# Patient Record
Sex: MALE | Race: WHITE | NOT HISPANIC OR LATINO | Employment: FULL TIME | URBAN - METROPOLITAN AREA
[De-identification: names, ages, dates, MRNs, and addresses within clinical notes are randomized per-mention and may not be internally consistent; named-entity substitution may affect disease eponyms.]

---

## 2017-02-17 ENCOUNTER — OFFICE VISIT - HEALTHEAST (OUTPATIENT)
Dept: FAMILY MEDICINE | Facility: CLINIC | Age: 32
End: 2017-02-17

## 2017-02-17 DIAGNOSIS — J20.9 ACUTE BRONCHITIS WITH BRONCHOSPASM: ICD-10-CM

## 2017-02-17 ASSESSMENT — MIFFLIN-ST. JEOR: SCORE: 1939.39

## 2017-02-24 ENCOUNTER — COMMUNICATION - HEALTHEAST (OUTPATIENT)
Dept: FAMILY MEDICINE | Facility: CLINIC | Age: 32
End: 2017-02-24

## 2017-02-24 DIAGNOSIS — J20.9 ACUTE BRONCHITIS WITH BRONCHOSPASM: ICD-10-CM

## 2017-04-25 ENCOUNTER — AMBULATORY - HEALTHEAST (OUTPATIENT)
Dept: FAMILY MEDICINE | Facility: CLINIC | Age: 32
End: 2017-04-25

## 2017-04-26 ENCOUNTER — OFFICE VISIT - HEALTHEAST (OUTPATIENT)
Dept: FAMILY MEDICINE | Facility: CLINIC | Age: 32
End: 2017-04-26

## 2017-04-26 ENCOUNTER — COMMUNICATION - HEALTHEAST (OUTPATIENT)
Dept: FAMILY MEDICINE | Facility: CLINIC | Age: 32
End: 2017-04-26

## 2017-04-26 ENCOUNTER — AMBULATORY - HEALTHEAST (OUTPATIENT)
Dept: FAMILY MEDICINE | Facility: CLINIC | Age: 32
End: 2017-04-26

## 2017-04-26 DIAGNOSIS — R21 RASH: ICD-10-CM

## 2017-04-26 ASSESSMENT — MIFFLIN-ST. JEOR: SCORE: 1989.28

## 2017-04-30 ENCOUNTER — COMMUNICATION - HEALTHEAST (OUTPATIENT)
Dept: FAMILY MEDICINE | Facility: CLINIC | Age: 32
End: 2017-04-30

## 2017-05-01 ENCOUNTER — AMBULATORY - HEALTHEAST (OUTPATIENT)
Dept: FAMILY MEDICINE | Facility: CLINIC | Age: 32
End: 2017-05-01

## 2017-05-14 ENCOUNTER — AMBULATORY - HEALTHEAST (OUTPATIENT)
Dept: FAMILY MEDICINE | Facility: CLINIC | Age: 32
End: 2017-05-14

## 2017-05-22 ENCOUNTER — AMBULATORY - HEALTHEAST (OUTPATIENT)
Dept: FAMILY MEDICINE | Facility: CLINIC | Age: 32
End: 2017-05-22

## 2017-05-22 DIAGNOSIS — R21 RASH: ICD-10-CM

## 2017-05-30 ENCOUNTER — RECORDS - HEALTHEAST (OUTPATIENT)
Dept: ADMINISTRATIVE | Facility: OTHER | Age: 32
End: 2017-05-30

## 2020-04-28 ENCOUNTER — RECORDS - HEALTHEAST (OUTPATIENT)
Dept: ADMINISTRATIVE | Facility: OTHER | Age: 35
End: 2020-04-28

## 2020-05-08 ENCOUNTER — OFFICE VISIT - HEALTHEAST (OUTPATIENT)
Dept: FAMILY MEDICINE | Facility: CLINIC | Age: 35
End: 2020-05-08

## 2020-05-08 DIAGNOSIS — R10.9 LEFT SIDED ABDOMINAL PAIN: ICD-10-CM

## 2020-05-30 ENCOUNTER — COMMUNICATION - HEALTHEAST (OUTPATIENT)
Dept: FAMILY MEDICINE | Facility: CLINIC | Age: 35
End: 2020-05-30

## 2020-05-30 DIAGNOSIS — R10.9 LEFT SIDED ABDOMINAL PAIN: ICD-10-CM

## 2020-06-26 ENCOUNTER — COMMUNICATION - HEALTHEAST (OUTPATIENT)
Dept: FAMILY MEDICINE | Facility: CLINIC | Age: 35
End: 2020-06-26

## 2020-06-26 DIAGNOSIS — R10.9 LEFT SIDED ABDOMINAL PAIN: ICD-10-CM

## 2020-10-26 ENCOUNTER — OFFICE VISIT - HEALTHEAST (OUTPATIENT)
Dept: FAMILY MEDICINE | Facility: CLINIC | Age: 35
End: 2020-10-26

## 2020-10-26 DIAGNOSIS — J01.90 ACUTE SINUSITIS WITH SYMPTOMS > 10 DAYS: ICD-10-CM

## 2020-11-06 ENCOUNTER — COMMUNICATION - HEALTHEAST (OUTPATIENT)
Dept: FAMILY MEDICINE | Facility: CLINIC | Age: 35
End: 2020-11-06

## 2020-11-06 DIAGNOSIS — R51.9 SINUS HEADACHE: ICD-10-CM

## 2020-11-09 ENCOUNTER — OFFICE VISIT - HEALTHEAST (OUTPATIENT)
Dept: OTOLARYNGOLOGY | Facility: CLINIC | Age: 35
End: 2020-11-09

## 2020-11-09 DIAGNOSIS — H69.92 DYSFUNCTION OF LEFT EUSTACHIAN TUBE: ICD-10-CM

## 2020-12-17 ENCOUNTER — OFFICE VISIT - HEALTHEAST (OUTPATIENT)
Dept: FAMILY MEDICINE | Facility: CLINIC | Age: 35
End: 2020-12-17

## 2020-12-17 DIAGNOSIS — Z11.59 ENCOUNTER FOR HCV SCREENING TEST FOR LOW RISK PATIENT: ICD-10-CM

## 2020-12-17 DIAGNOSIS — R20.2 TINGLING IN EXTREMITIES: ICD-10-CM

## 2020-12-17 DIAGNOSIS — Z13.29 SCREENING FOR ENDOCRINE, NUTRITIONAL, METABOLIC AND IMMUNITY DISORDER: ICD-10-CM

## 2020-12-17 DIAGNOSIS — Z13.21 SCREENING FOR ENDOCRINE, NUTRITIONAL, METABOLIC AND IMMUNITY DISORDER: ICD-10-CM

## 2020-12-17 DIAGNOSIS — M19.019 SHOULDER ARTHRITIS: ICD-10-CM

## 2020-12-17 DIAGNOSIS — Z13.228 SCREENING FOR ENDOCRINE, NUTRITIONAL, METABOLIC AND IMMUNITY DISORDER: ICD-10-CM

## 2020-12-17 DIAGNOSIS — Z13.0 SCREENING FOR ENDOCRINE, NUTRITIONAL, METABOLIC AND IMMUNITY DISORDER: ICD-10-CM

## 2020-12-17 DIAGNOSIS — Z11.4 SCREENING FOR HIV WITHOUT PRESENCE OF RISK FACTORS: ICD-10-CM

## 2020-12-18 ENCOUNTER — AMBULATORY - HEALTHEAST (OUTPATIENT)
Dept: LAB | Facility: CLINIC | Age: 35
End: 2020-12-18

## 2020-12-18 DIAGNOSIS — Z13.29 SCREENING FOR ENDOCRINE, NUTRITIONAL, METABOLIC AND IMMUNITY DISORDER: ICD-10-CM

## 2020-12-18 DIAGNOSIS — R20.2 TINGLING IN EXTREMITIES: ICD-10-CM

## 2020-12-18 DIAGNOSIS — Z11.4 SCREENING FOR HIV WITHOUT PRESENCE OF RISK FACTORS: ICD-10-CM

## 2020-12-18 DIAGNOSIS — Z13.0 SCREENING FOR ENDOCRINE, NUTRITIONAL, METABOLIC AND IMMUNITY DISORDER: ICD-10-CM

## 2020-12-18 DIAGNOSIS — Z11.59 ENCOUNTER FOR HCV SCREENING TEST FOR LOW RISK PATIENT: ICD-10-CM

## 2020-12-18 DIAGNOSIS — Z13.228 SCREENING FOR ENDOCRINE, NUTRITIONAL, METABOLIC AND IMMUNITY DISORDER: ICD-10-CM

## 2020-12-18 DIAGNOSIS — M19.019 SHOULDER ARTHRITIS: ICD-10-CM

## 2020-12-18 DIAGNOSIS — Z13.21 SCREENING FOR ENDOCRINE, NUTRITIONAL, METABOLIC AND IMMUNITY DISORDER: ICD-10-CM

## 2020-12-18 LAB
C REACTIVE PROTEIN LHE: <0.1 MG/DL (ref 0–0.8)
ERYTHROCYTE [SEDIMENTATION RATE] IN BLOOD BY WESTERGREN METHOD: 3 MM/HR (ref 0–15)
HBA1C MFR BLD: 5.6 %
HIV 1+2 AB+HIV1 P24 AG SERPL QL IA: NEGATIVE
MAGNESIUM SERPL-MCNC: 1.9 MG/DL (ref 1.8–2.6)
RHEUMATOID FACT SERPL-ACNC: <15 IU/ML (ref 0–30)
TSH SERPL DL<=0.005 MIU/L-ACNC: 1.49 UIU/ML (ref 0.3–5)
URATE SERPL-MCNC: 5 MG/DL (ref 3–8)
VIT B12 SERPL-MCNC: 333 PG/ML (ref 213–816)

## 2020-12-20 LAB — ZINC SERPL-MCNC: 83.9 UG/DL (ref 60–120)

## 2020-12-21 ENCOUNTER — COMMUNICATION - HEALTHEAST (OUTPATIENT)
Dept: FAMILY MEDICINE | Facility: CLINIC | Age: 35
End: 2020-12-21

## 2020-12-21 LAB
25(OH)D3 SERPL-MCNC: 27.6 NG/ML (ref 30–80)
ANA SER QL: 0.2 U
HCV AB SERPL QL IA: NEGATIVE

## 2020-12-22 LAB — CCP AB SER IA-ACNC: <0.5 U/ML

## 2020-12-28 LAB
B LOCUS: NORMAL
B27TEST METHOD: NORMAL

## 2020-12-29 ENCOUNTER — COMMUNICATION - HEALTHEAST (OUTPATIENT)
Dept: FAMILY MEDICINE | Facility: CLINIC | Age: 35
End: 2020-12-29

## 2020-12-29 DIAGNOSIS — R10.9 LEFT SIDED ABDOMINAL PAIN: ICD-10-CM

## 2021-01-14 ENCOUNTER — COMMUNICATION - HEALTHEAST (OUTPATIENT)
Dept: FAMILY MEDICINE | Facility: CLINIC | Age: 36
End: 2021-01-14

## 2021-01-14 DIAGNOSIS — R25.3 MUSCLE TWITCHING: ICD-10-CM

## 2021-01-14 DIAGNOSIS — R10.9 LEFT SIDED ABDOMINAL PAIN: ICD-10-CM

## 2021-02-13 ENCOUNTER — HEALTH MAINTENANCE LETTER (OUTPATIENT)
Age: 36
End: 2021-02-13

## 2021-04-01 ENCOUNTER — IMMUNIZATION (OUTPATIENT)
Dept: NURSING | Facility: CLINIC | Age: 36
End: 2021-04-01
Payer: COMMERCIAL

## 2021-04-01 PROCEDURE — 91300 PR COVID VAC PFIZER DIL RECON 30 MCG/0.3 ML IM: CPT

## 2021-04-01 PROCEDURE — 0001A PR COVID VAC PFIZER DIL RECON 30 MCG/0.3 ML IM: CPT

## 2021-04-22 ENCOUNTER — IMMUNIZATION (OUTPATIENT)
Dept: NURSING | Facility: CLINIC | Age: 36
End: 2021-04-22
Attending: FAMILY MEDICINE
Payer: COMMERCIAL

## 2021-04-22 PROCEDURE — 0002A PR COVID VAC PFIZER DIL RECON 30 MCG/0.3 ML IM: CPT

## 2021-04-22 PROCEDURE — 91300 PR COVID VAC PFIZER DIL RECON 30 MCG/0.3 ML IM: CPT

## 2021-05-03 ENCOUNTER — COMMUNICATION - HEALTHEAST (OUTPATIENT)
Dept: FAMILY MEDICINE | Facility: CLINIC | Age: 36
End: 2021-05-03

## 2021-05-03 DIAGNOSIS — R25.3 MUSCLE TWITCHING: ICD-10-CM

## 2021-05-26 ENCOUNTER — RECORDS - HEALTHEAST (OUTPATIENT)
Dept: ADMINISTRATIVE | Facility: CLINIC | Age: 36
End: 2021-05-26

## 2021-05-30 VITALS — HEIGHT: 68 IN | WEIGHT: 226 LBS | BODY MASS INDEX: 34.25 KG/M2

## 2021-05-30 VITALS — BODY MASS INDEX: 35.92 KG/M2 | WEIGHT: 237 LBS | HEIGHT: 68 IN

## 2021-06-02 ENCOUNTER — RECORDS - HEALTHEAST (OUTPATIENT)
Dept: ADMINISTRATIVE | Facility: OTHER | Age: 36
End: 2021-06-02

## 2021-06-02 ENCOUNTER — AMBULATORY - HEALTHEAST (OUTPATIENT)
Dept: LAB | Facility: HOSPITAL | Age: 36
End: 2021-06-02

## 2021-06-02 ENCOUNTER — OFFICE VISIT (OUTPATIENT)
Dept: NEUROLOGY | Facility: CLINIC | Age: 36
End: 2021-06-02
Payer: COMMERCIAL

## 2021-06-02 VITALS
HEART RATE: 87 BPM | HEIGHT: 69 IN | WEIGHT: 242 LBS | SYSTOLIC BLOOD PRESSURE: 153 MMHG | BODY MASS INDEX: 35.84 KG/M2 | DIASTOLIC BLOOD PRESSURE: 94 MMHG

## 2021-06-02 DIAGNOSIS — R25.3 FASCICULATION: ICD-10-CM

## 2021-06-02 DIAGNOSIS — M48.00 SPINAL STENOSIS, UNSPECIFIED SPINAL REGION: ICD-10-CM

## 2021-06-02 DIAGNOSIS — R25.3 FREQUENT FASCICULATION OF LIMB MUSCLE: Primary | ICD-10-CM

## 2021-06-02 PROBLEM — E55.9 VITAMIN D DEFICIENCY: Status: ACTIVE | Noted: 2021-06-02

## 2021-06-02 PROBLEM — M54.9 CHRONIC BACK PAIN: Status: ACTIVE | Noted: 2021-06-02

## 2021-06-02 PROBLEM — G89.29 CHRONIC BACK PAIN: Status: ACTIVE | Noted: 2021-06-02

## 2021-06-02 LAB
CK SERPL-CCNC: 182 U/L (ref 30–190)
VIT B12 SERPL-MCNC: 290 PG/ML (ref 213–816)

## 2021-06-02 PROCEDURE — 99205 OFFICE O/P NEW HI 60 MIN: CPT | Performed by: PSYCHIATRY & NEUROLOGY

## 2021-06-02 RX ORDER — MULTIVIT-MIN/IRON/FOLIC ACID/K 18-600-40
CAPSULE ORAL
COMMUNITY

## 2021-06-02 RX ORDER — MULTIVITAMIN WITH IRON
1 TABLET ORAL DAILY
COMMUNITY

## 2021-06-02 SDOH — HEALTH STABILITY: MENTAL HEALTH: HOW MANY STANDARD DRINKS CONTAINING ALCOHOL DO YOU HAVE ON A TYPICAL DAY?: NOT ASKED

## 2021-06-02 SDOH — HEALTH STABILITY: MENTAL HEALTH: HOW OFTEN DO YOU HAVE 6 OR MORE DRINKS ON ONE OCCASION?: NOT ASKED

## 2021-06-02 SDOH — HEALTH STABILITY: MENTAL HEALTH: HOW OFTEN DO YOU HAVE A DRINK CONTAINING ALCOHOL?: NOT ASKED

## 2021-06-02 ASSESSMENT — MIFFLIN-ST. JEOR: SCORE: 2023.08

## 2021-06-02 NOTE — NURSING NOTE
Chief Complaint   Patient presents with     Muscle twitching     Pt states he has noticed muscle twitching in bilat calves.     Alka Beasley LPN on 6/2/2021 at 11:58 AM
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
## 2021-06-03 LAB — B BURGDOR IGG+IGM SER QL: 0.12 INDEX VALUE

## 2021-06-04 LAB — HTLV I+II AB SER QL IA: NEGATIVE

## 2021-06-05 VITALS
RESPIRATION RATE: 12 BRPM | DIASTOLIC BLOOD PRESSURE: 84 MMHG | SYSTOLIC BLOOD PRESSURE: 129 MMHG | HEART RATE: 90 BPM | BODY MASS INDEX: 36.93 KG/M2 | WEIGHT: 245 LBS

## 2021-06-07 LAB
ASIALO-GM1 ANTIBODIES, IGG/IGM (HISTORICAL CONVERSION): NORMAL (ref 0–50)
GD1A ANTIBODIES, IGG/IGM (HISTORICAL CONVERSION): 6 IV (ref 0–50)
GD1B ANTIBODIES, IGG/IGM (HISTORICAL CONVERSION): 7 IV (ref 0–50)
GM1 GANGL IGG+IGM SER-ACNC: 11 IV (ref 0–50)
GM2 ANTIBODIES, IGG/IGM (HISTORICAL CONVERSION): 7 IV (ref 0–50)
GQ1B ANTIBODIES, IGG/IGM (HISTORICAL CONVERSION): 6 IV (ref 0–50)

## 2021-06-08 LAB
COPPER SERPL-MCNC: 118.4 UG/DL (ref 70–140)
METHYLMALONATE SERPL-SCNC: 0.12 UMOL/L (ref 0–0.4)

## 2021-06-08 NOTE — PROGRESS NOTES
"ASSESSMENT & PLAN:  1. Acute bronchitis with bronchospasm  - azithromycin (ZITHROMAX Z-SELINA) 250 MG tablet; 2 tabs (500 mg ) day #1, then 1 tab (250 mg) days #2-5, total 5 days  Dispense: 6 tablet; Refill: 0  - predniSONE (DELTASONE) 20 MG tablet; Take 2 tablets (40 mg total) by mouth daily for 5 days.  Dispense: 10 tablet; Refill: 0    Continue good hydration, extra vitamin C, possible side effect of the medication were discussed,return if not improving or his symptoms get worse.    There are no Patient Instructions on file for this visit.    No orders of the defined types were placed in this encounter.    There are no discontinued medications.    No Follow-up on file.    CHIEF COMPLAINT:  Chief Complaint   Patient presents with     Cough     X 12/01 - SEEN [facility] 01/08 - WORSE X 01/08       HISTORY OF PRESENT ILLNESS:  Krish is a 31 y.o. male presenting to the clinic today complaining of productive cough. He initially had dry cough around 12/01/17, which resolved after a few days. On 01/03/17, the cough returned with associated symptoms of chest tightness, headaches and fatigue. He visited the [facility] on 01/08/17 and was prescribed an albuterol inhaler and Tessalon. He is using the inhaler, but continues to have cough. He is using cough drops and tried Dayquil with minimal improvement. He denies fever, congestion, sore throat, and headache at this time. He has a constant \"tickle\" in his throat.    REVIEW OF SYSTEMS:   All other systems are negative.    PFSH:  Tobacco Use:  History   Smoking Status     Former Smoker   Smokeless Tobacco     Not on file       VITALS:  Vitals:    02/17/17 0925   BP: 122/84   Patient Site: Right Arm   Pulse: 68   Resp: 13   Temp: 98.7  F (37.1  C)   SpO2: 98%   Weight: (!) 226 lb (102.5 kg)   Height: 5' 8.3\" (1.735 m)     Wt Readings from Last 3 Encounters:   02/17/17 (!) 226 lb (102.5 kg)   08/30/16 (!) 231 lb (104.8 kg)     Body mass index is 34.06 kg/(m^2).    PHYSICAL " EXAM:  General:  Patient alert, in no acute distress.  Throat:  Oropharynx normal.   Neck:  Supple, without thyromegaly or mass, no adenopathies.  Lymphatic: Normal palpation of neck.  No lymphadenopathy.  No bruising.  Resp:  Minimal expiratory wheezing, sparse rales, no rhonchi in mid upper lung area.  Normal respiratory effort.   CV:  Regular rate and rhythm without murmurs, rubs or gallops.  Neuro:  CN II-XII intact.  Psychiatric:  Alert & oriented x 3.    ADDITIONAL HISTORY SUMMARIZED (2): None.  DECISION TO OBTAIN EXTRA INFORMATION (1): Care everywhere.   RADIOLOGY TESTS (1): None.  LABS (1): None.  MEDICINE TESTS (1): None.  INDEPENDENT REVIEW (2 each): None.     The visit lasted a total of 10 minutes face to face with the patient. Over 50% of the time was spent counseling and educating the patient about cough, antibiotics, steroids.    Jackie ARENAS, am scribing for and in the presence of, Dr. Cesar.    I, Dr. Cesar, personally performed the services described in this documentation, as scribed by Jackie Angel in my presence, and it is both accurate and complete.    Dragon dictation was used for this note.  Speech recognition errors are a possibility.    MEDICATIONS:  Current Outpatient Prescriptions   Medication Sig Dispense Refill     azithromycin (ZITHROMAX Z-SELINA) 250 MG tablet 2 tabs (500 mg ) day #1, then 1 tab (250 mg) days #2-5, total 5 days 6 tablet 0     predniSONE (DELTASONE) 20 MG tablet Take 2 tablets (40 mg total) by mouth daily for 5 days. 10 tablet 0     No current facility-administered medications for this visit.        Total data points: 1

## 2021-06-08 NOTE — PROGRESS NOTES
"Krish Mcallister is a 34 y.o. male who is being evaluated via a billable telephone visit.      The patient has been notified of following:     \"This telephone visit will be conducted via a call between you and your physician/provider. We have found that certain health care needs can be provided without the need for a physical exam.  This service lets us provide the care you need with a short phone conversation.  If a prescription is necessary we can send it directly to your pharmacy.  If lab work is needed we can place an order for that and you can then stop by our lab to have the test done at a later time.    Telephone visits are billed at different rates depending on your insurance coverage. During this emergency period, for some insurers they may be billed the same as an in-person visit.  Please reach out to your insurance provider with any questions.    If during the course of the call the physician/provider feels a telephone visit is not appropriate, you will not be charged for this service.\"    Patient has given verbal consent to a Telephone visit? Yes    What phone number would you like to be contacted at? [phone]    Patient would like to receive their AVS by AVS Preference: Mail a copy.    Additional provider notes:   Went to [facility] ER on 4/28/20 for left sided chest pain and mild dizziness.  Work-up normal (EKG, CXR, CBC, BMP, d dimer, troponin)  Pain still present, he describes it as more lower left chest pain, at lower anterior ribs into abdomen.  No new symptoms.  No constipation, diarrhea, fevers, or vomiting.  Appetite is better now than last week.  No significant change in eating habits prior to symptoms starting.  Never had pain like this before.  No dysuria, no blood in urine.  Pain does not radiate, lasts for a few sec, happens daily, 15-20 times a day, at rest or active, at random times.  No recent falls or injuries known.  Generally healthy, no current medications or health conditions, no " past surgeries.  No known sick contacts, no recent foreign travel or camping.  No significant use of NSAIDs.  Overall pain for 1.5 weeks now.  BMI 35.4 (calculated from vitals at ER visit)  BP normal at ER.    I reviewed ER note and all associated labs, diagnostic study reports today.    General: A&O x 3  Respiratory: no shortness of breath, wheezing, or cough heard  Neuro: answerers questions appropriately, normal mentation    Assessment/Plan:  1. Left sided abdominal pain  2. ER Follow-up  Differential includes GERD, H. Pylori, dyspepsia, musculoskeletal pain, nonspecific abdominal pain, kidney stone, versus other.  Gall bladder less likely given symptom report.  He does not have an acute abdomen, based on our conversation today.  Work-up for chest pain at ER grossly normal, no sign of PE or cardiac issues.  Discussed treatment options, including observation, trial of GERD medicine, GI referral, H. Pylori testing, CT scan.  Pain is not interfering with normal activities, but still present.  He would like to try GERD medicine for 1-2 weeks and monitor.  We discussed reasons to notify us sooner, or go to ER if needed.  If no significant improvement in 2 weeks, contact us and we can discuss next steps in work up.  - omeprazole (PRILOSEC) 20 MG capsule; Take 1 pill by mouth 1-2 times a day.  Dispense: 30 capsule; Refill: 0      Phone call duration:  15 minutes  14:07 14:22    ADI Forbes PA-C

## 2021-06-08 NOTE — TELEPHONE ENCOUNTER
Called and spoke with Krish Mcallister , Message was given, Krish Mcallister  understood, no further questions.

## 2021-06-12 NOTE — TELEPHONE ENCOUNTER
Question following Office Visit  When did you see your provider: 10/26/20  What is your question: Patient is done with amoxicllin given to him by Dr. Cesar for sinus infection.  He did not get any relief from antibiotic.  What is next step?  Okay to leave a detailed message: Yes

## 2021-06-12 NOTE — PROGRESS NOTES
HISTORY OF PRESENT ILLNESS  Asked to see by Dr. Cesar for evaluation of headache. Patient reports that about 2 months ago he had a sore throat that turned into a throbbing behind the ears. He was diagnosed with possible ET dysfunction and prescribed Flonase. He was also prescribed Amoxicillin that didn't do anything. The left ear feels full and throbs every once in awhile. No problems hearing. No vertigo. No drainage out of the ear. No prior ear surgery. No ringing or noises in the ear.     REVIEW OF SYSTEMS  Review of Systems: a 10-system review was performed. Pertinent positives are noted in the HPI and on a separate scanned document in the chart.    PMH, PSH, FH and SH has documented in the EHR.      EXAM    CONSTITUTIONAL  General Appearance:  Normal, well developed, well nourished, no obvious distress  Ability to Communicate:  communicates appropriately.    HEAD AND FACE  Appearance and Symmetry:  Normal, no scalp or facial scarring or suspicious lesions.  Paranasal sinuses tenderness:  Normal, Paranasal sinuses non tender    EYE  Normal external eye, conjunctiva, lids, cornea.     EARS  Clinical speech reception threshold:  Normal, able to hear normal speech.  Auricle:  Normal, Auricles without scars, lesions, masses.  External auditory canal:  Normal, External auditory canal normal.  Tympanic membrane:  Normal, Tympanic membranes normal without swelling or erythema.  Tympanic membrane mobility:  Normal, Normal tympanic membrane mobility.    NOSE (speculum or scope)  Architecture:  Normal, Grossly normal external nasal architecture with no masses or lesions.  Mucosa:  Normal mucosa, No polyps or masses.  Septum:  Normal, Septum non-obstructing.  Turbinates:  Normal, No turbinate abnormalities    NASOPHARYNX  Clear without evidence of inflammation or infection.    ORAL CAVITY AND OROPHARYNX  Lips:  Normal.  Dental and gingiva:  Normal, No obvious dental or gingival disease.  Mucosa:  Normal, Moist mucous  membranes.  Tongue:  Normal, Tongue mobile with no mucosal abnormalities  Hard and soft palate:  Normal, Hard and soft palate without cleft or mucosal lesions.  Oral pharynx:  Normal, Posterior pharynx without lesions or remarkable asymmetry.  Saliva:  Normal, Clear saliva.  Masses:  Normal, No palpable masses or pathologically enlarged lymph nodes.    NECK  Masses/lymph nodes:  Normal, No worrisome neck masses or lymph nodes.  Salivary glands:  Normal, Parotid and submandibular glands.  Trachea and larynx position:  Normal, Trachea and larynx midline.  Thyroid:  Normal, No thyroid abnormality.  Tenderness:  Normal, No cervical tenderness.  Suppleness:  Normal, Neck supple    CARDIOVASCULAR  Regular rate and rhythm.  No cyanosis, clubbing or edema.    PULSES  Carotid pulses normal    NEUROLOGICAL  Speech pattern:  Normal, Proasaic    RESPIRATORY  Symmetry and Respiratory effort:  Normal, Symmetric chest movement and expansion with no increased intercostal retractions or use of accessory muscles.     IMPRESSION  Agree with the initial impression of Eustachian tube dysfunction. However I explained that we would need to obtain a hearing test for more certainty. I explained that ET dysfunction can take 3 or months to resolve. Steroid sprays can be hit or miss.    RECOMMENDATION  I explained that we can certainly schedule a hearing test. He would prefer to observe for another month or two and if it doesn't resolve, he will call to schedule a hearing test.    Lev Pearce MD

## 2021-06-12 NOTE — PROGRESS NOTES
OFFICE VISIT - FAMILY MEDICINE     ASSESSMENT AND PLAN     1. Acute sinusitis with symptoms > 10 days  amoxicillin (AMOXIL) 875 MG tablet   Headaches, differential diagnosis discussed included, migraine, sinus headache etc., will treat his acute sphenoidal sinusitis with amoxicillin, continue Tylenol or Excedrin as needed, follow-up if no improvement.    CHIEF COMPLAINT   Headache (ongoing headaches for a couple of weeks, the headache is located above his left eyebrow) and Follow up (ringing in left ear)    HPI   Krish RUBIO Sisterman is a 35 y.o. male.  Updated MIIC     Did have cold symptoms with sore throat, nasal congestion about a month and a half ago, he was treated symptomatically, he did have with virtual visit, Flonase was suggested, he did use that with some mild relief of symptoms, for the past few days been noticing more  headaches in the middle of the head area, above the eyebrow, mild throbbing, no phonophobia, photophobia, mild ear and sinus pressure.  Has not tried any other specific treatment.    Review of Systems As per HPI, otherwise negative.    OBJECTIVE   /84 (Patient Site: Right Arm, Patient Position: Sitting, Cuff Size: Adult Large)   Pulse 90   Resp 12   Wt (!) 245 lb (111.1 kg)   BMI 36.93 kg/m    Physical Exam   Constitutional: He is oriented to person, place, and time. He appears well-developed and well-nourished.   HENT:   Head: Normocephalic and atraumatic.   Neck: Normal range of motion. Neck supple. No JVD present. No tracheal deviation present. No thyromegaly present.   Cardiovascular: Normal rate, regular rhythm, normal heart sounds and intact distal pulses. Exam reveals no gallop and no friction rub.   No murmur heard.  Pulmonary/Chest: Effort normal and breath sounds normal. No respiratory distress. He has no wheezes. He has no rales.   Musculoskeletal:         General: No tenderness or edema.   Lymphadenopathy:     He has no cervical adenopathy.   Neurological: He is alert  and oriented to person, place, and time. Coordination normal.   Psychiatric: He has a normal mood and affect. Judgment and thought content normal.       PFSH     Family History   Problem Relation Age of Onset     No Medical Problems Mother      No Medical Problems Sister      No Medical Problems Brother      No Medical Problems Son      No Medical Problems Maternal Aunt      No Medical Problems Maternal Uncle      No Medical Problems Paternal Aunt      No Medical Problems Paternal Uncle      Alcohol abuse Maternal Grandmother      Breast cancer Maternal Grandmother      Cancer Maternal Grandfather      Stroke Paternal Grandmother      Hypertension Paternal Grandfather      Social History     Socioeconomic History     Marital status:      Spouse name: Not on file     Number of children: Not on file     Years of education: Not on file     Highest education level: Not on file   Occupational History     Not on file   Social Needs     Financial resource strain: Not on file     Food insecurity     Worry: Not on file     Inability: Not on file     Transportation needs     Medical: Not on file     Non-medical: Not on file   Tobacco Use     Smoking status: Current Every Day Smoker     Smokeless tobacco: Never Used   Substance and Sexual Activity     Alcohol use: Not on file     Drug use: Not on file     Sexual activity: Not on file   Lifestyle     Physical activity     Days per week: Not on file     Minutes per session: Not on file     Stress: Not on file   Relationships     Social connections     Talks on phone: Not on file     Gets together: Not on file     Attends Evangelical service: Not on file     Active member of club or organization: Not on file     Attends meetings of clubs or organizations: Not on file     Relationship status: Not on file     Intimate partner violence     Fear of current or ex partner: Not on file     Emotionally abused: Not on file     Physically abused: Not on file     Forced sexual  activity: Not on file   Other Topics Concern     Not on file   Social History Narrative     Not on file     Relevant history was reviewed with the patient today, unless noted in HPI, nothing is pertinent for this visit.  [facility]     Patient Active Problem List    Diagnosis Date Noted     Obesity (BMI 30.0-34.9) 08/30/2016     No past surgical history on file.    RESULTS/CONSULTS (Lab/Rad)   No results found for this or any previous visit (from the past 168 hour(s)).  No results found.  MEDICATIONS     Current Outpatient Medications on File Prior to Visit   Medication Sig Dispense Refill     nystatin (MYCOSTATIN) cream APPLY TOPICALLY TO AFFECTED AREA TWICE A DAY. MIX IN EQUAL PARTS WITH TRIAMCINOLONE AND APPLY  0     omeprazole (PRILOSEC) 20 MG capsule Take 1 pill by mouth once a day 30 capsule 0     triamcinolone (KENALOG) 0.1 % cream APPLY SMALL AMOUNT TO AFFECTED AREA TWICE DAILY X14D. MIX AS DIRECTED IN EQUAL PARTS WITH NYSTATIN.  0     No current facility-administered medications on file prior to visit.        HEALTH MAINTENANCE / SCREENING   PHQ-2 Total Score: 0 (10/26/2020 11:30 AM)  Depression Follow-up Plan: mental health screening assessment (10/26/2020 11:30 AM)  , No data recorded,No data recorded  Immunization History   Administered Date(s) Administered     DTaP, historic 10/19/2000     INFLUENZA,SEASONAL QUAD, PF, =/> 6months 10/10/2018, 11/01/2019     Influenza, inj, historic,unspecified 10/10/2012, 09/25/2013, 10/12/2016, 10/05/2020     MMR 05/01/1997     Td,adult,historic,unspecified 05/01/1997     Tdap 10/10/2012, 08/30/2016     Health Maintenance   Topic     HEPATITIS C SCREENING      Pneumococcal Vaccine: Pediatrics (0 to 5 Years) and At-Risk Patients (6 to 64 Years) (1 of 1 - PPSV23)     HIV SCREENING      PREVENTIVE CARE VISIT      LIPID      ADVANCE CARE PLANNING      TDAP ADULT ONE TIME DOSE      INFLUENZA VACCINE RULE BASED      HEPATITIS B VACCINES      TD 18+ LISSETTE HERNÁNDEZ  MD Arsenio  Family Medicine, [facility]     This note was dictated using a voice recognition software.  Any grammatical or context distortion are unintentional and inherent to the software.

## 2021-06-13 NOTE — TELEPHONE ENCOUNTER
Reason for Call:  Request for results:    Name of test or procedure: bloodwork  multiple  tests    Date of test of procedure: 12/18/2020    Location of the test or procedure: [facility]      OK to leave the result message on voice mail or with a family member? Yes     Phone number Patient can be reached at:   Cell number on file:    Telephone Information:   Mobile [phone]       Additional comments:none  Call taken on 12/21/2020 at 2:40 PM by Kayla Palacios

## 2021-06-13 NOTE — PROGRESS NOTES
"Krish Mcallister is a 35 y.o. male who is being evaluated via a billable telephone visit.      The patient has been notified of following:     \"This telephone visit will be conducted via a call between you and your physician/provider. We have found that certain health care needs can be provided without the need for a physical exam.  This service lets us provide the care you need with a short phone conversation.  If a prescription is necessary we can send it directly to your pharmacy.  If lab work is needed we can place an order for that and you can then stop by our lab to have the test done at a later time.    Telephone visits are billed at different rates depending on your insurance coverage. During this emergency period, for some insurers they may be billed the same as an in-person visit.  Please reach out to your insurance provider with any questions.    If during the course of the call the physician/provider feels a telephone visit is not appropriate, you will not be charged for this service.\"    Patient has given verbal consent to a Telephone visit? Yes    What phone number would you like to be contacted at? [phone]  Patient would like to receive their AVS by AVS Preference: Gayle.    Additional provider notes: Has been experiencing soreness of both shoulder, tingling sensation at both calf and tongue, symptoms are getting to a point that they seems to be interfering with his quality of life, has been there for the past few weeks.  No swelling lower extremities, no difficulty moving his upper extremity, but persistent soreness.  Denies any fever chills.  No nausea vomiting.  He did have some issue with recurrent sinusitis, ringing in the ear was seen by ENT, symptoms are improving.    Assessment/Plan:  1. Shoulder arthritis  - Uric Acid; Future  - Antinuclear Antibodies Screen (JOSEPHINE); Future  - CCP Antibodies; Future  - Rheumatoid Factor Quant; Future  - C-Reactive Protein; Future  - HLA-B27 Typing; " Future  - Erythrocyte Sedimentation Rate; Future    2. Tingling in extremities  - Vitamin B12; Future  - Zinc, Serum or Plasma; Future  - Magnesium; Future    3. Screening for HIV without presence of risk factors  - HIV Antigen/Antibody Screening Cascade; Future    4. Encounter for HCV screening test for low risk patient  - Hepatitis C Antibody (Anti-HCV); Future    5. Screening for endocrine, nutritional, metabolic and immunity disorder  - Thyroid Cascade; Future  - Glycosylated Hemoglobin A1c; Future  - Vitamin D, Total (25-Hydroxy); Future    Shoulder arthritis, tingling in extremity and tongue, we did review a broad differential included dehydration, electrolytes imbalance, vitamin deficiency etc., encouraged patient to stay hydrated, currently taking multivitamin daily, will schedule some arthritis and additional lab works, he will be notified of the results, will let us know if his symptoms get worse in the meantime.  Phone call duration:  12 minutes

## 2021-06-14 ENCOUNTER — HOSPITAL ENCOUNTER (OUTPATIENT)
Dept: MRI IMAGING | Facility: HOSPITAL | Age: 36
Discharge: HOME | End: 2021-06-14
Attending: PSYCHIATRY & NEUROLOGY
Payer: COMMERCIAL

## 2021-06-14 DIAGNOSIS — R25.3 FREQUENT FASCICULATION OF LIMB MUSCLE: ICD-10-CM

## 2021-06-14 DIAGNOSIS — M48.00 SPINAL STENOSIS, UNSPECIFIED SPINAL REGION: ICD-10-CM

## 2021-06-14 NOTE — TELEPHONE ENCOUNTER
Caller:  Patient.   Message:  Patient stated he started taking OTC vitamin D and Magnesium as you had requested him. He is starting to have muscle twitching on his leg since Friday. He started these medications 12/26/2020. Patient wants to know if there is anything he needs to do?     Okay to leave a detail message when calling patient back.

## 2021-06-15 ENCOUNTER — MYC MEDICAL ADVICE (OUTPATIENT)
Dept: NEUROLOGY | Facility: CLINIC | Age: 36
End: 2021-06-15

## 2021-06-15 ENCOUNTER — TELEPHONE (OUTPATIENT)
Dept: NEUROLOGY | Facility: CLINIC | Age: 36
End: 2021-06-15

## 2021-06-15 NOTE — TELEPHONE ENCOUNTER
Pt called. [facility] Does he still need the EMG ?  [phone]

## 2021-06-15 NOTE — TELEPHONE ENCOUNTER
Called Krish and left him a message- yes he needs to have the EMG. I will also send him a 51fanli message as well.   Kailee Alfredo CMA on 6/15/2021 at 2:36 PM

## 2021-06-17 ENCOUNTER — OFFICE VISIT (OUTPATIENT)
Dept: NEUROLOGY | Facility: CLINIC | Age: 36
End: 2021-06-17
Payer: COMMERCIAL

## 2021-06-17 ENCOUNTER — RECORDS - HEALTHEAST (OUTPATIENT)
Dept: ADMINISTRATIVE | Facility: OTHER | Age: 36
End: 2021-06-17

## 2021-06-17 ENCOUNTER — OFFICE VISIT (OUTPATIENT)
Dept: NEUROLOGY | Facility: CLINIC | Age: 36
End: 2021-06-17
Attending: PSYCHIATRY & NEUROLOGY
Payer: COMMERCIAL

## 2021-06-17 VITALS
WEIGHT: 240 LBS | SYSTOLIC BLOOD PRESSURE: 192 MMHG | HEART RATE: 91 BPM | BODY MASS INDEX: 36.37 KG/M2 | HEIGHT: 68 IN | DIASTOLIC BLOOD PRESSURE: 110 MMHG

## 2021-06-17 DIAGNOSIS — G56.01 CARPAL TUNNEL SYNDROME OF RIGHT WRIST: ICD-10-CM

## 2021-06-17 DIAGNOSIS — S12.100A DENS FRACTURE, CLOSED, INITIAL ENCOUNTER (H): Primary | ICD-10-CM

## 2021-06-17 DIAGNOSIS — R25.3 FREQUENT FASCICULATION OF LIMB MUSCLE: ICD-10-CM

## 2021-06-17 DIAGNOSIS — R25.3 FREQUENT FASCICULATION OF LIMB MUSCLE: Primary | ICD-10-CM

## 2021-06-17 DIAGNOSIS — R25.3 BENIGN FASCICULATION-CRAMP SYNDROME: ICD-10-CM

## 2021-06-17 DIAGNOSIS — M48.00 SPINAL STENOSIS, UNSPECIFIED SPINAL REGION: ICD-10-CM

## 2021-06-17 DIAGNOSIS — M47.816 SPONDYLOSIS OF LUMBAR REGION WITHOUT MYELOPATHY OR RADICULOPATHY: ICD-10-CM

## 2021-06-17 DIAGNOSIS — E66.01 MORBID OBESITY (H): ICD-10-CM

## 2021-06-17 PROCEDURE — 95886 MUSC TEST DONE W/N TEST COMP: CPT | Mod: RT | Performed by: PSYCHIATRY & NEUROLOGY

## 2021-06-17 PROCEDURE — 95913 NRV CNDJ TEST 13/> STUDIES: CPT | Performed by: PSYCHIATRY & NEUROLOGY

## 2021-06-17 PROCEDURE — 99214 OFFICE O/P EST MOD 30 MIN: CPT | Mod: 25 | Performed by: PSYCHIATRY & NEUROLOGY

## 2021-06-17 ASSESSMENT — MIFFLIN-ST. JEOR: SCORE: 1993.13
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
## 2021-06-17 NOTE — LETTER
6/17/2021         RE: Krish Mcallister  [address] [facility] [place] [address]        Dear Colleague,    Thank you for referring your patient, Krish Mcallister, to the [facility] [place]. Please see a copy of my visit note below.    See procedure note for EMG report      Again, thank you for allowing me to participate in the care of your patient.        Sincerely,        Husam Guerra MD

## 2021-06-17 NOTE — NURSING NOTE
Component      Latest Ref Rng & Units 6/2/2021   Asialo-GM1 Antibodies, IgG/IgM      0 - 50 See Note   GM1 Antibodies, IgG/IgM      0 - 50 IV 11   GM2 Antibodies, IgG/IgM      0 - 50 IV 7   GD1a Antibodies, IgG/IgM      0 - 50 IV 6   GD1b Antibodies, IgG/IgM      0 - 50 IV 7   GQ1b Antibodies, IgG/IgM      0 - 50 IV 6   HTLV-I/-II AB SCREEN, S      Negative Negative   Copper, Serum/Plasma      70.0 - 140.0 ug/dL 118.4   MMA Serum/Plasma, Vitamin B12 Status      0.00 - 0.40 umol/L 0.12   Lyme Antibody Cascade      <0.90 Index Value 0.12   Vitamin B-12      213 - 816 pg/mL 290   CK, Total      30 - 190 U/L 182

## 2021-06-17 NOTE — NURSING NOTE
EXAM: MR LUMBAR SPINE WO CONTRAST  LOCATION: [facility]  DATE/TIME: 6/14/2021 9:29 AM     INDICATION: Fasciculation, back pain  COMPARISON: None.  TECHNIQUE: Routine Lumbar Spine MRI without IV contrast.     FINDINGS:   Nomenclature is based on 5 lumbar type vertebral bodies. Normal vertebral body heights and alignment. No concerning bone marrow lesions. Normal distal spinal cord and cauda equina with conus medullaris at L1-L2. Unremarkable visualized bony pelvis.     Several levels demonstrate mild disc desiccation. Disc space heights are maintained. Several levels demonstrate small Schmorl's nodes. T11 vertebral body inferior endplate prominent Schmorl's nodes with adjacent mild degenerative type I Modic change.   Multiple levels demonstrate small endplate osteophytes. Facet compatible with mild multilevel degenerative disc disease.     T11-T12: Mild bulge. No significant thecal sac stenosis. No significant neural foraminal stenosis.     T12-L1: No significant bulge or posterior disc herniation.   No significant thecal sac stenosis.  No significant left foraminal stenosis.  No significant right foraminal stenosis.     L1-L2: No significant bulge or posterior disc herniation.   No significant thecal sac stenosis.  No significant left foraminal stenosis.  No significant right foraminal stenosis.     L2-L3: No significant bulge or posterior disc herniation.   No significant thecal sac stenosis.  No significant left foraminal stenosis.  No significant right foraminal stenosis.      L3-L4: No significant bulge or posterior disc herniation.   No significant thecal sac stenosis.  No significant left foraminal stenosis.  No significant right foraminal stenosis.     L4-L5: Slight bulge.   No significant thecal sac stenosis.  Minimal left foraminal stenosis.  Minimal right foraminal stenosis.     L5-S1: Slight bulge.   No significant thecal sac stenosis. Left foraminal small disc osteophyte complex  coming in proximity and possibly contacting the exiting left L5 nerve root. Minimal left foraminal stenosis.  No significant right foraminal stenosis.           ADDITIONAL FINDINGS:  Colonic diverticulosis.     IMPRESSION:   1.  Mild degenerative changes described above.  2.  L5-S1 left foraminal small disc osteophyte complex coming in proximity and possibly contacting the exiting left L5 nerve root. Minimal left foraminal stenosis.  3.  No severe high-grade neural foraminal stenosis.  4.  No significant thecal sac stenosis

## 2021-06-17 NOTE — LETTER
6/17/2021         RE: Krish Mcallister  [address] [facility] [place] [address]        Dear Colleague,    Thank you for referring your patient, Krish Mcallister, to the [facility]UROLOG[facility] [place]. Please see a copy of my visit note below.    See procedure note for EMG report      Again, thank you for allowing me to participate in the care of your patient.        Sincerely,        Husam Guerra MD

## 2021-06-17 NOTE — PROCEDURES
ELECTROMYOGRAPHY (EMG) REPORT       [facility] NEUROLOGY [place] [address] [place] [address]  Tel: [phone]  Fax: [phone]  [email or web address]     Krish Mcallister,  1985, MRN [id number]  PCP: Waldemar Cesar  Date: 2021     Principal Diagnosis:    Frequent fasciculation of limb muscle  Spinal stenosis, unspecified spinal region     Height: 5 feet 8 inch  Reason for referral: Evaluate right upper/bilateral lowers. c/o pain, muscle twitching in legs > a year.       Motor NCS      Nerve / Sites Lat Amp Dist Abel    ms mV cm m/s   R Median - APB      Wrist 4.64 3.7 7       Elbow 9.11 3.3 25 56   R Ulnar - ADM      Wrist 2.71 12.2 7       B.Elbow 6.15 11.9 20.5 60      A.Elbow 7.76 11.9 10 62   R Peroneal - EDB      Ankle 4.22 7.0 8       Fib head 10.68 6.7 29.5 46      Pop fossa 12.86 5.9 10 46   L Peroneal - EDB      Ankle 3.23 10.7 8       Fib head 9.74 10.6 30 46      Pop fossa 11.93 10.3 10 46   R Tibial - AH      Ankle 4.95 11.3 8       Pop fossa 13.23 10.7 39 47   L Tibial - AH      Ankle 4.53 11.2 8       Pop fossa 12.71 10.6 38 46       F  Wave      Nerve Fmin    ms   R Ulnar - ADM 28.33   R Tibial - AH 54.01   L Tibial - AH 50.16       Sensory NCS      Nerve / Sites Onset Lat Pk Lat Amp.2-3 Dist Abel Lat Diff    ms ms  V cm m/s ms   R Median - II (Antidr)      Wrist 3.02 3.80 54.0 13 43    R Ulnar - V (Antidr)      Wrist 1.98 2.55 43.2 11 56    R Median, Ulnar - Transcarpal comparison      Median Palm 2.08 2.71 29.5 8 38       Ulnar Palm 1.41 1.93 25.5 8 57          0.78   R Sural - Ankle (Calf)      Calf 2.76 3.54 35.1 14 51    L Sural - Ankle (Calf)      Calf 2.55 3.44 26.1 14 55    R Superficial peroneal - Ankle      Lat leg 2.34 2.92 8.9 12 51    L Superficial peroneal - Ankle      Lat leg 2.29 2.97 23.8 12 52        EMG Summary Table     Spontaneous MUAP Rcmt Note   Muscle Fib PSW Fasc IA # Amp Dur PPP Rate Type   R. Gluteus medius None None None N  N N N N N N   R. Gluteus yola None None None N N N N N N N   R. L3 paraspinal None None None N N N N N N N   R. L4 paraspinal None None None N N N N N N N   R. L5 paraspinal None None None N N N N N N N   R. S1 paraspinal None None None N N N N N N N   R. Adductor rich None None None N N N N N N N   R. Quadriceps None None None N N N N N N N   R. Gastrocnemius (Medial head) None None None N N N N N N N   R. Tibialis anterior None None None N N N N N N N   R. Tibialis posterior None None None N N N N N N N   R. Brachioradialis None None None N N N N N N N   R. Pronator teres None None None N N N N N N N   R. Biceps brachii None None None N N N N N N N   R. Deltoid None None None N N N N N N N   R. Triceps brachii None None None N N N N N N N   R. Flexor carpi ulnaris None None None N N N N N N N   R. First dorsal interosseous None None None N N N N N N N   R. Abductor pollicis brevis None None None N N N N N N N        Summary: Nerve conduction and EMG study of upper and lower extremities shows:  1. Delayed right median distal motor latency with normal amplitude and conduction velocity.  2. Normal right ulnar, bilateral peroneal and bilateral tibial distal motor latencies, amplitudes and conduction velocities  3. Normal bilateral tibial and right ulnar F latency  4. Delayed right median peak sensory latency with slow sensory nerve conduction velocity  5. Normal bilateral sural, superficial peroneal and right ulnar SNAP  6. Disposable, monopolar needle exam right upper and lower extremity was within normal limits    Impression:   This is a abnormal nerve conduction and EMG study that suggests mild to moderate right carpal tunnel syndrome.  There is no evidence of a motor neuron disease.      Husam Guerra MD  [facility]  (Formerly, [facility]      This note was dictated using voice recognition software.  Any grammatical or context distortions are  unintentional and inherent to the software.

## 2021-06-17 NOTE — NURSING NOTE
Chief Complaint   Patient presents with     Results     Discuss EMG, lab and MRI results     Alka Beasley LPN on 6/17/2021 at 1:58 PM

## 2021-06-17 NOTE — TELEPHONE ENCOUNTER
Called and left detailed message to patient regarding new referral and that he should be receiving a phone call to schedule. I also provided the number in case patient would like to call himself. Instructed patent to call back if he had any questions.   Maira Beasley

## 2021-06-17 NOTE — TELEPHONE ENCOUNTER
Reason for Call:  Other      Detailed comments: pt is calling to say he and dr anders spoke about a ref to neuology. It appears dr anders entered a ref in January but entered pediatric!  Can we please get a ref in the chart today so pt can schedule with adult neuro?    Phone Number Patient can be reached at: Home number on file [phone] (home)    Best Time: any    Can we leave a detailed message on this number?: Yes    Call taken on 5/3/2021 at 11:09 AM by Jasmyne Rivas
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
## 2021-06-17 NOTE — NURSING NOTE
Study Result    EXAM: MR CERVICAL SPINE WO CONTRAST  LOCATION: [facility]  DATE/TIME: 6/14/2021 9:31 AM     INDICATION: Fasciculation, neck pain  COMPARISON: None.  TECHNIQUE: MRI Cervical Spine without IV contrast.     FINDINGS:   C2 base of dens demonstrates a horizontally linear signal abnormality with decreased T2/STIR and decreased T1 signal. There is no associated bone marrow edema. No prevertebral soft tissue swelling. Findings may reflect persistent/residual subdental   synchondrosis. Late subacute or chronic type II dens fracture possible. CT cervical spine may be of benefit in further characterizing.     Normal vertebral body heights and alignment. No concerning bone marrow lesions. No abnormal cord signal.      Cervical spine demonstrates mild disc desiccation. Disc space heights are maintained. Otherwise, no significant degenerative disc disease. Scattered mild uncovertebral and facet arthropathy. Upper thoracic spine posterior epidural lipomatosis. Mild   congenitally small spinal canal.     Craniovertebral junction and C1-C2: No significant thecal sac stenosis.     C2-C3: No significant bulge or posterior disc herniation.   No significant thecal sac stenosis.  No significant left foraminal stenosis.  No significant right foraminal stenosis.     C3-C4: No significant bulge or posterior disc herniation. Mild uncovertebral arthropathy. Mild thecal sac stenosis.  Mild left foraminal stenosis.  Mild right foraminal stenosis.     C4-C5: Mild bulge. Mild uncovertebral arthropathy.   Mild thecal sac stenosis.  No significant left foraminal stenosis.  No significant right foraminal stenosis.     C5-C6: Mild bulge. Small superimposed central posterior disc extrusion extending mildly above and below the disc margin. Mild uncovertebral facet degenerative change. Mild thecal sac stenosis.  No significant left foraminal stenosis.  No significant   right foraminal stenosis.     C6-C7: Mild  bulge. Small superimposed central posterior disc protrusion.   Mild thecal sac stenosis.  No significant left foraminal stenosis.  No significant right foraminal stenosis.     C7-T1: Slight bulge.   No significant thecal sac stenosis.  No significant left foraminal stenosis.  No significant right foraminal stenosis.       ADDITIONAL FINDINGS:  Prominent nasopharyngeal soft tissues with small retention cysts.     IMPRESSION:   1.  C2 base of dens demonstrates a horizontal linear defect described above. There is no associated bone marrow edema. No prevertebral soft tissue swelling. Findings may reflect persistent/residual subdental synchondrosis (normal variant). Late subacute   or chronic type II dens fracture possible. CT cervical spine may be of benefit in further characterizing.  2.  Mild degenerative changes described above.  3.  No critical thecal sac stenosis.  4.  No severe high-grade neural foraminal stenosis.  5.  No cord signal abnormality

## 2021-06-18 ENCOUNTER — RECORDS - HEALTHEAST (OUTPATIENT)
Dept: ADMINISTRATIVE | Facility: REHABILITATION | Age: 36
End: 2021-06-18

## 2021-06-18 ENCOUNTER — RECORDS - HEALTHEAST (OUTPATIENT)
Dept: ADMINISTRATIVE | Facility: OTHER | Age: 36
End: 2021-06-18

## 2021-06-18 DIAGNOSIS — M47.816 SPONDYLOSIS OF LUMBAR REGION WITHOUT MYELOPATHY OR RADICULOPATHY: ICD-10-CM

## 2021-06-21 NOTE — LETTER
Letter by Waldemar Cesar MD at      Author: Waldemar Cesar MD Service: -- Author Type: --    Filed:  Encounter Date: 12/21/2020 Status: (Other)         Krish Mcallister  [address] [place] [address]             December 22, 2020         Dear Mr. Mcallisetr,    Below are the results from your recent visit:    Resulted Orders   Uric Acid   Result Value Ref Range    Uric Acid 5.0 3.0 - 8.0 mg/dL   Antinuclear Antibodies Screen (JOSEPHINE)   Result Value Ref Range    JOSEPHINE Screen Cascade 0.2 <=2.9 U    Narrative    <1.0 negative  1.1-2.9 weakly positive  3.0-5.9 positive ( reflex)  > or=6.0 strongly positive   Rheumatoid Factor Quant   Result Value Ref Range    Rheumatoid Factor Quantitative <15.0 0 - 30 IU/mL   C-Reactive Protein   Result Value Ref Range    CRP <0.1 0.0 - 0.8 mg/dL   Erythrocyte Sedimentation Rate   Result Value Ref Range    Sed Rate 3 0 - 15 mm/hr   Thyroid Cascade   Result Value Ref Range    TSH 1.49 0.30 - 5.00 uIU/mL   Glycosylated Hemoglobin A1c   Result Value Ref Range    Hemoglobin A1c 5.6 <=5.6 %   Vitamin B12   Result Value Ref Range    Vitamin B-12 333 213 - 816 pg/mL   Vitamin D, Total (25-Hydroxy)   Result Value Ref Range    Vitamin D, Total (25-Hydroxy) 27.6 (L) 30.0 - 80.0 ng/mL    Narrative    Deficiency <10.0 ng/mL  Insufficiency 10.0-29.9 ng/mL  Sufficiency 30.0-80.0 ng/mL  Toxicity (possible) >100.0 ng/mL   Zinc, Serum or Plasma   Result Value Ref Range    Zinc, Serum/Plasma 83.9 60.0 - 120.0 ug/dL      Comment:      INTERPRETIVE INFORMATION: Zinc, Serum or Plasma    Elevated results may be due to skin or collection-related   contamination, including the use of a noncertified metal-free   collection/transport tube. If contamination concerns exist due to   elevated levels of serum/plasma zinc, confirmation with a second   specimen collected in a certified metal-free tube is recommended.    Circulating zinc concentrations are dependent on albumin status   and are  depressed with malnutrition.  Zinc may also be lowered   with infection, inflammation, stress, oral contraceptives, and   pregnancy.  Zinc may be elevated with zinc supplementation or   fasting.  Elevated zinc concentrations may interfere with copper   absorption.     Test developed and characteristics determined by [facility]. See Compliance Statement B: [email or web address]  Performed By: [facility]  [address] [place] [address]  : Vonda Wagner MD   HIV Antigen/Antibody Screening Cascade   Result Value Ref Range    HIV Antigen / Antibody Negative Negative    Narrative    Method is Abbott HIV Ag/Ab for the detection of HIV p24 antigen, HIV-1 antibodies and HIV-2 antibodies.   Hepatitis C Antibody (Anti-HCV)   Result Value Ref Range    Hepatitis C Ab Negative Negative   Magnesium   Result Value Ref Range    Magnesium 1.9 1.8 - 2.6 mg/dL       Vitamin D was just mildly low, you can start taking over-the-counter vitamin D3 1000 unit daily.    Please call with questions or contact us using [employer].    Sincerely,        Electronically signed by Waldemar Cesar MD

## 2021-06-21 NOTE — LETTER
Letter by Waldemar Cesar MD at      Author: Waldemar Cesar MD Service: -- Author Type: --    Filed:  Encounter Date: 12/21/2020 Status: (Other)         Krish Mcallister  [id number] [place] [id number]             December 21, 2020         Dear Mr. Mcallister,    Below are the results from your recent visit:    Resulted Orders   Uric Acid   Result Value Ref Range    Uric Acid 5.0 3.0 - 8.0 mg/dL   Rheumatoid Factor Quant   Result Value Ref Range    Rheumatoid Factor Quantitative <15.0 0 - 30 IU/mL   C-Reactive Protein   Result Value Ref Range    CRP <0.1 0.0 - 0.8 mg/dL   Erythrocyte Sedimentation Rate   Result Value Ref Range    Sed Rate 3 0 - 15 mm/hr   Thyroid Cascade   Result Value Ref Range    TSH 1.49 0.30 - 5.00 uIU/mL   Glycosylated Hemoglobin A1c   Result Value Ref Range    Hemoglobin A1c 5.6 <=5.6 %   Vitamin B12   Result Value Ref Range    Vitamin B-12 333 213 - 816 pg/mL   Zinc, Serum or Plasma   Result Value Ref Range    Zinc, Serum/Plasma 83.9 60.0 - 120.0 ug/dL      Comment:      INTERPRETIVE INFORMATION: Zinc, Serum or Plasma    Elevated results may be due to skin or collection-related   contamination, including the use of a noncertified metal-free   collection/transport tube. If contamination concerns exist due to   elevated levels of serum/plasma zinc, confirmation with a second   specimen collected in a certified metal-free tube is recommended.    Circulating zinc concentrations are dependent on albumin status   and are depressed with malnutrition.  Zinc may also be lowered   with infection, inflammation, stress, oral contraceptives, and   pregnancy.  Zinc may be elevated with zinc supplementation or   fasting.  Elevated zinc concentrations may interfere with copper   absorption.     Test developed and characteristics determined by "[facility]. See Compliance Statement B: [email or web address]  Performed By: "[facility]  [address] [place] [address]  [id number]  : Vonda Wagner MD   HIV Antigen/Antibody Screening Cascade   Result Value Ref Range    HIV Antigen / Antibody Negative Negative    Narrative    Method is Abbott HIV Ag/Ab for the detection of HIV p24 antigen, HIV-1 antibodies and HIV-2 antibodies.   Magnesium   Result Value Ref Range    Magnesium 1.9 1.8 - 2.6 mg/dL       So far all the inflammatory and electrolytes maculae within normal limits, nothing to explain the musculoskeletal soreness and tingling underneath the tongue.  Awaiting for small lab result to come back.    Please call with questions or contact us using [employer].    Sincerely,        Electronically signed by Waldemar Cesar MD

## 2021-07-07 ENCOUNTER — HOSPITAL ENCOUNTER (OUTPATIENT)
Dept: CT IMAGING | Facility: HOSPITAL | Age: 36
Discharge: HOME | End: 2021-07-07
Attending: PSYCHIATRY & NEUROLOGY
Payer: COMMERCIAL

## 2021-07-07 DIAGNOSIS — S12.100A DENS FRACTURE, CLOSED, INITIAL ENCOUNTER (H): ICD-10-CM

## 2021-08-03 ENCOUNTER — HOSPITAL ENCOUNTER (OUTPATIENT)
Dept: PHYSICAL THERAPY | Facility: REHABILITATION | Age: 36
End: 2021-08-03
Payer: COMMERCIAL

## 2021-08-03 DIAGNOSIS — M54.50 CHRONIC BILATERAL LOW BACK PAIN WITHOUT SCIATICA: Primary | ICD-10-CM

## 2021-08-03 DIAGNOSIS — G89.29 CHRONIC BILATERAL LOW BACK PAIN WITHOUT SCIATICA: Primary | ICD-10-CM

## 2021-08-03 DIAGNOSIS — M62.81 MUSCLE WEAKNESS (GENERALIZED): ICD-10-CM

## 2021-08-03 PROCEDURE — 97110 THERAPEUTIC EXERCISES: CPT | Mod: GP | Performed by: PHYSICAL THERAPIST

## 2021-08-03 PROCEDURE — 97161 PT EVAL LOW COMPLEX 20 MIN: CPT | Mod: GP | Performed by: PHYSICAL THERAPIST
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
## 2021-08-20 ENCOUNTER — HOSPITAL ENCOUNTER (OUTPATIENT)
Dept: PHYSICAL THERAPY | Facility: REHABILITATION | Age: 36
End: 2021-08-20
Payer: COMMERCIAL

## 2021-08-20 DIAGNOSIS — M62.81 MUSCLE WEAKNESS (GENERALIZED): ICD-10-CM

## 2021-08-20 DIAGNOSIS — G89.29 CHRONIC BILATERAL LOW BACK PAIN WITHOUT SCIATICA: Primary | ICD-10-CM

## 2021-08-20 DIAGNOSIS — M54.50 CHRONIC BILATERAL LOW BACK PAIN WITHOUT SCIATICA: Primary | ICD-10-CM

## 2021-08-20 PROCEDURE — 97110 THERAPEUTIC EXERCISES: CPT | Mod: GP | Performed by: PHYSICAL THERAPIST

## 2021-09-18 ENCOUNTER — HEALTH MAINTENANCE LETTER (OUTPATIENT)
Age: 36
End: 2021-09-18

## 2021-11-19 ENCOUNTER — OFFICE VISIT (OUTPATIENT)
Dept: FAMILY MEDICINE | Facility: CLINIC | Age: 36
End: 2021-11-19
Payer: COMMERCIAL

## 2021-11-19 VITALS
HEIGHT: 68 IN | WEIGHT: 243.2 LBS | OXYGEN SATURATION: 99 % | SYSTOLIC BLOOD PRESSURE: 120 MMHG | BODY MASS INDEX: 36.86 KG/M2 | DIASTOLIC BLOOD PRESSURE: 70 MMHG | HEART RATE: 99 BPM

## 2021-11-19 DIAGNOSIS — F17.200 TOBACCO USE DISORDER: ICD-10-CM

## 2021-11-19 DIAGNOSIS — M79.622 AXILLARY PAIN, LEFT: Primary | ICD-10-CM

## 2021-11-19 PROCEDURE — 99213 OFFICE O/P EST LOW 20 MIN: CPT | Performed by: FAMILY MEDICINE

## 2021-11-19 ASSESSMENT — MIFFLIN-ST. JEOR: SCORE: 2007.65

## 2021-11-19 NOTE — ASSESSMENT & PLAN NOTE
Cessation strongly advised and desired by patient, next step is to choose a quit date and rtc for discussion of quit aids, he agrees.

## 2021-11-19 NOTE — ASSESSMENT & PLAN NOTE
He complains of pain in his upper outer pectoral muscle.  The pain is shooting and lasts less than two minutes at a time and is noted 4-5 times a day since August. There might be a pressure type radiation of pain in the axillary line over the lateral ribcage below the nipple level. He recalls jumping in a bounce house with his kids the week prior to this all starting and thought maybe he injured the area.     No associated nausea, vomiting,  Diaphoresis, lightheaded and no exertional quality.     It is possible he injured a nerve in the bounce house and that nerve is now irritated and causing his sx especially since it is not exertional and it is sharp in [facility].   We discussed gabapentin to help with nerve pain but the side effect of weight gain is not likely worth the benefit so he declined.   We discussed ekg although there is no exertional quality and no clear indication for ekg.   We also discussed doing ultrasound but no mass or other abnormality on exam and ultrasound would likely be unremarkable.   We also discussed neurology consult since it is a sharp quality pain it is likely nerve related but unclear that neurologist would do anything besides confirm this.   At this time since the pain is 3/10 in intensity he opts to watch and wait, but if it worsens he will return.

## 2021-11-19 NOTE — PROGRESS NOTES
Assessment & Plan   Problem List Items Addressed This Visit        Nervous and Auditory    Axillary pain, left - Primary     He complains of pain in his upper outer pectoral muscle.  The pain is shooting and lasts less than two minutes at a time and is noted 4-5 times a day since August. There might be a pressure type radiation of pain in the axillary line over the lateral ribcage below the nipple level. He recalls jumping in a bounce house with his kids the week prior to this all starting and thought maybe he injured the area.     No associated nausea, vomiting,  Diaphoresis, lightheaded and no exertional quality.     It is possible he injured a nerve in the bounce house and that nerve is now irritated and causing his sx especially since it is not exertional and it is sharp in [place].   We discussed gabapentin to help with nerve pain but the side effect of weight gain is not likely worth the benefit so he declined.   We discussed ekg although there is no exertional quality and no clear indication for ekg.   We also discussed doing ultrasound but no mass or other abnormality on exam and ultrasound would likely be unremarkable.   We also discussed neurology consult since it is a sharp quality pain it is likely nerve related but unclear that neurologist would do anything besides confirm this.   At this time since the pain is 3/10 in intensity he opts to watch and wait, but if it worsens he will return.             Behavioral    Tobacco use disorder     Cessation strongly advised and desired by patient, next step is to choose a quit date and rtc for discussion of quit aids, he agrees.                    Tobacco Cessation:   reports that he has been smoking cigarettes. He has been smoking about 0.50 packs per day. He has never used smokeless tobacco.  Tobacco Cessation Action Plan: Information offered: Patient not interested at this time    BMI:   Estimated body mass index is 36.98 kg/m  as calculated from the  "following:    Height as of this encounter: 1.727 m (5' 8\").    Weight as of this encounter: 110.3 kg (243 lb 3.2 oz).       Return if symptoms worsen or fail to improve.    Abby Madera MD  [facility]    Chief Complaint   Patient presents with     Pain under left armpit since August 2021        Subjective   Krish is a 36 year old who presents for the following health issues - sharp pain in armpit area               Objective    /70 (BP Location: Left arm, Patient Position: Sitting, Cuff Size: Adult Large)   Pulse 99   Ht 1.727 m (5' 8\")   Wt 110.3 kg (243 lb 3.2 oz)   SpO2 99%   BMI 36.98 kg/m    Body mass index is 36.98 kg/m .  Physical Exam  Constitutional:       Appearance: Normal appearance.   HENT:      Head: Normocephalic and atraumatic.   Cardiovascular:      Rate and Rhythm: Normal rate and regular rhythm.   Pulmonary:      Effort: Pulmonary effort is normal.   Chest:   Breasts:      Right: No axillary adenopathy or supraclavicular adenopathy.      Left: No axillary adenopathy or supraclavicular adenopathy.       Musculoskeletal:         General: Normal range of motion.        Arms:       Cervical back: Normal range of motion and neck supple.      Comments: Pain is in lateral pectoral area, but is not reproduced with palpation.   Lymphadenopathy:      Cervical: No cervical adenopathy.      Right cervical: No superficial, deep or posterior cervical adenopathy.     Left cervical: No superficial, deep or posterior cervical adenopathy.      Upper Body:      Right upper body: No supraclavicular, axillary, pectoral or epitrochlear adenopathy.      Left upper body: No supraclavicular, axillary, pectoral or epitrochlear adenopathy.   Neurological:      General: No focal deficit present.      Mental Status: He is alert.                  Answers for HPI/ROS submitted by the patient on 11/19/2021  How many servings of fruits and vegetables do you eat daily?: 2-3  On average, how " many sweetened beverages do you drink each day (Examples: soda, juice, sweet tea, etc.  Do NOT count diet or artificially sweetened beverages)?: 1  How many minutes a day do you exercise enough to make your heart beat faster?: 10 to 19  How many days a week do you exercise enough to make your heart beat faster?: 5  How many days per week do you miss taking your medication?: 0

## 2022-03-05 ENCOUNTER — HEALTH MAINTENANCE LETTER (OUTPATIENT)
Age: 37
End: 2022-03-05

## 2022-07-12 NOTE — PROGRESS NOTES
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 still pain in the center of the low back. Pateint reports neck pain is improved an imaging was negative on his. Exercises are going well     Objective Measurements:  Objective Measure: Lumbar AROM   Details: flexion to mid tibia tight in low back and calves; extension WNL feels good, SB and rotation each way is WNL and pain free      Goals:  Goal Identifier HEP    Goal Description Patient will be independent in a HEP to assit in recovery and prevent reoccurrance in 12 weeks   Target Date 10/26/21   Date Met      Progress (detail required for progress note):       Goal Identifier Sitting   Goal Description Patient will be able to sit >30 min with pain no greater than 2/10 for work and driving activities in 12 weeks    Target Date 10/26/21   Date Met      Progress (detail required for progress note):       Goal Identifier Standing    Goal Description Patient will be able to stand > 30 min for increase ease in ADLs, home chores and self cares in 12 weeks    Target Date 10/26/21   Date Met      Progress (detail required for progress note):         Plan:  Discharge from therapy.    Discharge:    Reason for Discharge: Patient chooses to discontinue therapy.  Patient has failed to schedule further appointments.  Patient cancelled his last session and then did not return to clinic, above is the last known status towards goals.     Equipment Issued: none    Discharge Plan: Patient to continue home program.  Renee Santa, PT, DPT, CLMIR

## 2022-07-12 NOTE — ADDENDUM NOTE
Encounter addended by: Renee Santa, PT on: 7/12/2022 7:31 AM   Actions taken: Episode resolved, Clinical Note Signed

## 2022-11-20 ENCOUNTER — HEALTH MAINTENANCE LETTER (OUTPATIENT)
Age: 37
End: 2022-11-20

## 2023-04-15 ENCOUNTER — HEALTH MAINTENANCE LETTER (OUTPATIENT)
Age: 38
End: 2023-04-15

## 2024-06-22 ENCOUNTER — HEALTH MAINTENANCE LETTER (OUTPATIENT)
Age: 39
End: 2024-06-22

## 2025-06-09 ENCOUNTER — PATIENT OUTREACH (OUTPATIENT)
Dept: CARE COORDINATION | Facility: CLINIC | Age: 40
End: 2025-06-09
Payer: COMMERCIAL